# Patient Record
Sex: MALE | Race: BLACK OR AFRICAN AMERICAN | Employment: FULL TIME | URBAN - METROPOLITAN AREA
[De-identification: names, ages, dates, MRNs, and addresses within clinical notes are randomized per-mention and may not be internally consistent; named-entity substitution may affect disease eponyms.]

---

## 2017-01-21 ENCOUNTER — ED HISTORICAL/CONVERTED ENCOUNTER (OUTPATIENT)
Dept: OTHER | Age: 32
End: 2017-01-21

## 2021-01-28 ENCOUNTER — OFFICE VISIT (OUTPATIENT)
Dept: NEUROLOGY | Age: 36
End: 2021-01-28
Payer: COMMERCIAL

## 2021-01-28 VITALS
TEMPERATURE: 97.3 F | BODY MASS INDEX: 37.37 KG/M2 | HEIGHT: 73 IN | HEART RATE: 67 BPM | WEIGHT: 282 LBS | OXYGEN SATURATION: 98 % | SYSTOLIC BLOOD PRESSURE: 122 MMHG | DIASTOLIC BLOOD PRESSURE: 86 MMHG

## 2021-01-28 DIAGNOSIS — D86.9 SARCOIDOSIS: ICD-10-CM

## 2021-01-28 DIAGNOSIS — G43.011 INTRACTABLE MIGRAINE WITHOUT AURA AND WITH STATUS MIGRAINOSUS: Primary | ICD-10-CM

## 2021-01-28 PROCEDURE — 99204 OFFICE O/P NEW MOD 45 MIN: CPT | Performed by: PSYCHIATRY & NEUROLOGY

## 2021-01-28 RX ORDER — SUMATRIPTAN 100 MG/1
TABLET, FILM COATED ORAL
Qty: 9 TAB | Refills: 2 | Status: SHIPPED | OUTPATIENT
Start: 2021-01-28

## 2021-01-28 RX ORDER — BUTALBITAL, ACETAMINOPHEN AND CAFFEINE 300; 40; 50 MG/1; MG/1; MG/1
CAPSULE ORAL
COMMUNITY
Start: 2021-01-17

## 2021-01-28 RX ORDER — PREDNISONE 10 MG/1
TABLET ORAL
Qty: 21 TAB | Refills: 0 | Status: SHIPPED | OUTPATIENT
Start: 2021-01-28

## 2021-01-28 NOTE — PROGRESS NOTES
Lakeisha Merida (1985) is a 38 y.o. male, new patient, here for evaluation of:    Chief complaint(s):   Chief Complaint   Patient presents with    Migraine     Referred by: [facility]    ASSESSMENT/ PLAN:    ICD-10-CM ICD-9-CM    1. Intractable migraine without aura and with status migrainosus  G43.011 346.13 MRI BRAIN W WO CONT      predniSONE (DELTASONE) 10 mg tablet      SUMAtriptan (IMITREX) 100 mg tablet   2. Sarcoidosis  D86.9 135 MRI BRAIN W WO CONT     Intractable migraine, Hx of Sarcoidosis    For persistent migraine, Rx'd 6 day course of prednisone, Sumatriptan 100 mg tablet prn migraine (limit use to 2 days per week)    Check MRI Brain +/- contrast to evaluate for any potential CNS involvement from Sarcoidosis    Follow up in 6 weeks, sooner if needed      Follow-up and Dispositions    · Return in about 6 weeks (around 3/11/2021).         =========================================    SUBJECTIVE/ OBJECTIVE:    HPI: 38 y.o. male     Pt reports sporadically, starting sometime in his 25s, every couple years, has had severe headache front or back of head, no vomiting, no nausea, + light sensitivity, sound sensitivity. Pt reports he woke up 3 weeks ago, had a slight headache, was doing yard work, started with a typical headache, evolved to pounding, pressure, nausea, vomited (one time). Tried treating at home with OTC analgesics for a couple days, persisted, so went to [facility] (a week and a half ago), given Rx for Fioricet. Headache pain ranges from a 3/10 to 10/ 10 daily. He's taking Fioricet 3 times a day and in between that he's taking BC Powder. No precipitating head injury. Never tried sumatriptan or other -triptan. No hx of CAD, no hx of HTN,     Does have a hx of Sarcoidosis. Per wife, in remission since 2010 (was admitted at [facility] and treated there for 2-3 weeks).   Not on any meds for this since discharge and he says the symptoms he was having at the time has not recurred. Review of Systems: frequent headaches    No Known Allergies    Current Outpatient Medications   Medication Sig Dispense Refill    butalbital-acetaminophen-caff (FIORICET) -40 mg per capsule TAKE 1 CAPSULE BY MOUTH THREE TIMES A DAY AS NEEDED      predniSONE (DELTASONE) 10 mg tablet Take 6 tabs on Day 1, then reduce by 1 tablet a day over the next 5 days till done. Take in AM with food. 21 Tab 0    SUMAtriptan (IMITREX) 100 mg tablet Take HALF to one tablet to onset of migraine. May take one tablet in 2 hours if headache remains. Limit use to 2 days per week. 9 Tab 2       No past medical history on file. No past surgical history on file. family history is not on file. reports that he has been smoking. He has never used smokeless tobacco. He reports previous alcohol use. Physical Exam:    Vitals:    01/28/21 0747   BP: 122/86   BP 1 Location: Left arm   BP Patient Position: Sitting   Pulse: 67   Temp: 97.3 °F (36.3 °C)   SpO2: 98%   Weight: 127.9 kg (282 lb)   Height: 6' 1\" (1.854 m)         General:  Alert, cooperative, NAD   Head:  Normocephalic, atraumatic. No occipital or suboccipital tenderness on either side  Mild tenderness in right supraorbital notch, none in left  Mild tenderness to lateral aspect of corner of right eye/ temporal area     Eyes:  Conjunctivae clear  Left cornea, possible mild cataract  Right cornea appears clear     Lungs:  Heart:  Not examined  Not examined   Extremities: No edema.    Skin: No rashes    Neurologic Exam       Language: normal  Memory:  Alert, oriented to person, place, situation    Cranial Nerves:  I: smell Not tested   II: visual fields Full to confrontation   II: pupils Equal, round, reactive to light   II: optic disc No papilledema   III,VII: ptosis none   III,IV,VI: extraocular muscles  normal   V: facial light touch sensation  normal   VII: facial muscle function  symmetric   VIII: hearing symmetric   IX: soft palate elevation  normal   XI: sternocleidomastoid strength 5/5   XII: tongue  midline      Motor: normal bulk, tone, strength in all exts  Sensory: intact to LT, PP, temp, vibration x 4 exts   Cerebellar: no rest, postural, or intention tremor  Normal FNF and H-Shin bilaterally  Reflexes: 2+ throughout  Plantar response: neutral bilaterally    Gait: normal gait including tandem  Romberg negative       An electronic signature was used to authenticate this note.   -- Thang Ledezma MD

## 2021-01-28 NOTE — PATIENT INSTRUCTIONS
PRESCRIPTION REFILL POLICY [address] [place] [address] Statement to Patients April 1, 2014 In an effort to ensure the large volume of patient prescription refills is processed in the most efficient and expeditious manner, we are asking our patients to assist us by calling your Pharmacy for all prescription refills, this will include also your  Mail Order Pharmacy. The pharmacy will contact our office electronically to continue the refill process. Please do not wait until the last minute to call your pharmacy. We need at least 48 hours (2days) to fill prescriptions. We also encourage you to call your pharmacy before going to  your prescription to make sure it is ready. With regard to controlled substance prescription refill requests (narcotic refills) that need to be picked up at our office, we ask your cooperation by providing us with at least 72 hours (3days) notice that you will need a refill. We will not refill narcotic prescription refill requests after 4:00pm on any weekday, Monday through Thursday, or after 2:00pm on Fridays, or on the weekends. We encourage everyone to explore another way of getting your prescription refill request processed using [employer], our patient web portal through our electronic medical record system. [employer] is an efficient and effective way to communicate your medication request directly to the office and  downloadable as an lashell on your smart phone . [employer] also features a review functionality that allows you to view your medication list as well as leave messages for your physician. Are you ready to get connected? If so please review the attatched instructions or speak to any of our staff to get you set up right away! Thank you so much for your cooperation. Should you have any questions please contact our Practice Administrator. The Physicians and Staff,  [address] [place] [address]

## 2021-02-15 ENCOUNTER — HOSPITAL ENCOUNTER (OUTPATIENT)
Dept: MRI IMAGING | Age: 36
Discharge: HOME | End: 2021-02-15
Attending: PSYCHIATRY & NEUROLOGY
Payer: COMMERCIAL

## 2021-02-15 DIAGNOSIS — D86.9 SARCOIDOSIS: ICD-10-CM

## 2021-02-15 DIAGNOSIS — G43.011 INTRACTABLE MIGRAINE WITHOUT AURA AND WITH STATUS MIGRAINOSUS: ICD-10-CM

## 2021-02-15 PROCEDURE — 70553 MRI BRAIN STEM W/O & W/DYE: CPT

## 2021-02-15 PROCEDURE — A9575 INJ GADOTERATE MEGLUMI 0.1ML: HCPCS | Performed by: PSYCHIATRY & NEUROLOGY

## 2021-02-15 PROCEDURE — 74011250636 HC RX REV CODE- 250/636: Performed by: PSYCHIATRY & NEUROLOGY

## 2021-02-15 RX ORDER — GADOTERATE MEGLUMINE 376.9 MG/ML
20 INJECTION INTRAVENOUS
Status: COMPLETED | OUTPATIENT
Start: 2021-02-15 | End: 2021-02-15

## 2021-02-15 RX ADMIN — GADOTERATE MEGLUMINE 20 ML: 376.9 INJECTION INTRAVENOUS at 09:55

## 2021-02-23 ENCOUNTER — TELEPHONE (OUTPATIENT)
Dept: NEUROLOGY | Age: 36
End: 2021-02-23

## 2021-02-23 NOTE — TELEPHONE ENCOUNTER
----- Message from Allen Sitter sent at 2/23/2021  1:12 PM EST -----  Regarding: Dr. Jayda Lawler first and last name: Jamee Coburn  Reason for call: follow up MRI  Callback required yes/no and why: yes  Best contact number(s): [phone]  Details to clarify the request: PT missed appt on 3/23 (traffic) and would like to discuss via phone results from MRI.

## 2021-03-10 ENCOUNTER — VIRTUAL VISIT (OUTPATIENT)
Dept: NEUROLOGY | Age: 36
End: 2021-03-10
Payer: COMMERCIAL

## 2021-03-10 DIAGNOSIS — Z86.2 HISTORY OF SARCOIDOSIS: ICD-10-CM

## 2021-03-10 DIAGNOSIS — G43.009 MIGRAINE WITHOUT AURA AND WITHOUT STATUS MIGRAINOSUS, NOT INTRACTABLE: Primary | ICD-10-CM

## 2021-03-10 PROCEDURE — 99214 OFFICE O/P EST MOD 30 MIN: CPT | Performed by: PSYCHIATRY & NEUROLOGY

## 2021-03-10 NOTE — PROGRESS NOTES
Iliana Barone (: 1985) is a 38 y.o. male, established patient, here for evaluation of the following chief complaint(s):     Follow-up (virtual visit/ migraine follow up and to discuss brain MRI results) and Migraine    ASSESSMENT/PLAN:  1. Migraine without aura and without status migrainosus, not intractable  2. History of sarcoidosis    Brain MRI with and without contrast is normal, no evidence of neurosarcoidosis  No indication to start a daily headache preventive medication based on his current headache frequency  Discussed with patient that he can continue to use sumatriptan when he has a migraine headache  He currently has 2 refills left on his last prescription, does not need another prescription today  We discussed whether he wants to set up a follow-up visit regarding his headaches; he declined  Patient may schedule follow-up visit if needed    No follow-ups on file. SUBJECTIVE/OBJECTIVE:  HPI    Reviewed MRI Brain images (with and without contrast) and report: normal study, no evidence of enhancing lesions, no evidence of stroke or any other structural abnormality; normal for age. Has some mild headache some days when he wakes up  Goes away after being up  # of headache days a week: 2  # of severe headache days per month: 1-2    The persistent headache he was having at his initial visit resolved after taking a steroid pack and using sumatriptan. He denies any side effects from the sumatriptan. Review of Systems     No flowsheet data found. No Known Allergies  Current Outpatient Medications   Medication Sig Dispense Refill    butalbital-acetaminophen-caff (FIORICET) -40 mg per capsule TAKE 1 CAPSULE BY MOUTH THREE TIMES A DAY AS NEEDED      SUMAtriptan (IMITREX) 100 mg tablet Take HALF to one tablet to onset of migraine. May take one tablet in 2 hours if headache remains. Limit use to 2 days per week.  9 Tab 2    predniSONE (DELTASONE) 10 mg tablet Take 6 tabs on Day 1, then reduce by 1 tablet a day over the next 5 days till done. Take in AM with food. 21 Tab 0     Past Medical History:   Diagnosis Date    Migraine       has no past surgical history on file. Physical Exam awake, alert, conversant. EOMI. Face appears symmetric. Hearing and speech is normal.                Deondre Hughes, was evaluated through a synchronous (real-time) audio-video encounter. The patient (or guardian if applicable) is aware that this is a billable service. Verbal consent to proceed has been obtained within the past 12 months. The visit was conducted pursuant to the emergency declaration under the [address] [place] [address], [address] [place] [address] [facility] authority and the [employer] and [employer] General Act. Patient identification was verified, and a caregiver was present when appropriate. The patient was located in a state where the provider was credentialed to provide care. An electronic signature was used to authenticate this note.   -- Tiffanie Braun MD

## 2024-05-09 ENCOUNTER — OFFICE VISIT (OUTPATIENT)
Facility: CLINIC | Age: 39
End: 2024-05-09

## 2024-05-09 VITALS
OXYGEN SATURATION: 97 % | SYSTOLIC BLOOD PRESSURE: 130 MMHG | RESPIRATION RATE: 17 BRPM | BODY MASS INDEX: 39.82 KG/M2 | WEIGHT: 294 LBS | HEART RATE: 76 BPM | TEMPERATURE: 98.9 F | HEIGHT: 72 IN | DIASTOLIC BLOOD PRESSURE: 81 MMHG

## 2024-05-09 DIAGNOSIS — R52 PAIN OF LEFT SIDE OF BODY: Primary | ICD-10-CM

## 2024-05-09 ASSESSMENT — ENCOUNTER SYMPTOMS
ALLERGIC/IMMUNOLOGIC NEGATIVE: 1
PHOTOPHOBIA: 0
RHINORRHEA: 0
EYE PAIN: 0
CHEST TIGHTNESS: 0
ABDOMINAL PAIN: 1
WHEEZING: 0
ABDOMINAL DISTENTION: 0
DIARRHEA: 0
COUGH: 0
NAUSEA: 0
VOMITING: 0
BACK PAIN: 1
SORE THROAT: 0
SHORTNESS OF BREATH: 0

## 2024-05-09 ASSESSMENT — PATIENT HEALTH QUESTIONNAIRE - PHQ9
SUM OF ALL RESPONSES TO PHQ QUESTIONS 1-9: 0
SUM OF ALL RESPONSES TO PHQ QUESTIONS 1-9: 0
2. FEELING DOWN, DEPRESSED OR HOPELESS: NOT AT ALL
SUM OF ALL RESPONSES TO PHQ QUESTIONS 1-9: 0
1. LITTLE INTEREST OR PLEASURE IN DOING THINGS: NOT AT ALL
SUM OF ALL RESPONSES TO PHQ QUESTIONS 1-9: 0
SUM OF ALL RESPONSES TO PHQ9 QUESTIONS 1 & 2: 0

## 2024-05-09 NOTE — PROGRESS NOTES
Chief Complaint   Patient presents with    New Patient     Pt states he has been having pain(tightness) in his side/flank that radiates through abdomin and back, no trauma. He advised pain is off and on,went to [facility] sent to [facility] for CT scan        /81 (Site: Left Upper Arm, Position: Sitting, Cuff Size: Large Adult)   Pulse 76   Temp 98.9 °F (37.2 °C) (Temporal)   Resp 17   Ht 1.835 m (6' 0.24\")   Wt 133.4 kg (294 lb)   SpO2 97%   BMI 39.60 kg/m²     Pain Scale: 2/10  Pain Location: Other (flank)     No current outpatient medications on file prior to visit.     No current facility-administered medications on file prior to visit.       Health Maintenance Due   Topic    Hepatitis B vaccine (1 of 3 - 3-dose series)    COVID-19 Vaccine (1)    Varicella vaccine (1 of 2 - 2-dose childhood series)    Pneumococcal 0-64 years Vaccine (1 of 2 - PCV)    Depression Screen     HIV screen     Hepatitis C screen     DTaP/Tdap/Td vaccine (1 - Tdap)    Diabetes screen     Lipids        1. \"Have you been to the ER, urgent care clinic since your last visit?  Hospitalized since your last visit?\" [facility]-side/flank pain    2. \"Have you seen or consulted any other health care providers outside of the [facility] since your last visit?\" no

## 2024-11-08 ENCOUNTER — OFFICE VISIT (OUTPATIENT)
Dept: PRIMARY CARE CLINIC | Facility: CLINIC | Age: 39
End: 2024-11-08
Payer: COMMERCIAL

## 2024-11-08 VITALS
SYSTOLIC BLOOD PRESSURE: 131 MMHG | DIASTOLIC BLOOD PRESSURE: 78 MMHG | OXYGEN SATURATION: 98 % | WEIGHT: 285.4 LBS | HEART RATE: 67 BPM | BODY MASS INDEX: 38.66 KG/M2 | HEIGHT: 72 IN

## 2024-11-08 DIAGNOSIS — R10.9 CHRONIC LEFT FLANK PAIN: Primary | ICD-10-CM

## 2024-11-08 DIAGNOSIS — G89.29 CHRONIC LEFT FLANK PAIN: Primary | ICD-10-CM

## 2024-11-08 DIAGNOSIS — Z13.220 SCREENING FOR HYPERLIPIDEMIA: ICD-10-CM

## 2024-11-08 PROCEDURE — 99203 OFFICE O/P NEW LOW 30 MIN: CPT | Performed by: STUDENT IN AN ORGANIZED HEALTH CARE EDUCATION/TRAINING PROGRAM

## 2024-11-08 NOTE — PROGRESS NOTES
Kurt Mcneal (:  1985) is a 39 y.o. male,New patient, here for evaluation of the following chief complaint(s):  New Patient (Establish care. Have been having pain on right side of chest all back mostly on one side. Started 2 years ago nobody know what it could. )         Assessment & Plan  Chronic left flank pain    Chronic left flank pain with unknown etiology.  Labs and imaging ordered for initial evaluation.  Note that patient had a CT scan completed notes that he is unaware of the results.  Advised patient to reach out to radiology department that completed it and bring records in.  Plan for follow-up in approximately 2 weeks to review CT scan imaging if he is able to acquire it as well as the lab results from today's visit.    Orders:    XR LUMBAR SPINE (2-3 VIEWS); Future    CBC with Auto Differential    Comprehensive Metabolic Panel    Screening for hyperlipidemia       Orders:    Lipid Panel      No follow-ups on file.       Subjective   HPI  Patient is a very pleasant 39-year-old male presents with right sided abdominal wall. Has been ongoing for 2 years. Pain comes and go. Does not radiate from the back, primarily located on left flank, and band-like distribution in epigastric region. Currently has the pain, describes it as a 2/10.  Review of Systems       Objective   Physical Exam  Vitals and nursing note reviewed.   Constitutional:       Appearance: Normal appearance. He is normal weight.   HENT:      Head: Normocephalic and atraumatic.      Mouth/Throat:      Mouth: Mucous membranes are moist.      Pharynx: Oropharynx is clear.   Cardiovascular:      Rate and Rhythm: Normal rate and regular rhythm.      Heart sounds: Normal heart sounds.   Pulmonary:      Effort: Pulmonary effort is normal. No respiratory distress.      Breath sounds: Normal breath sounds.   Abdominal:      General: Bowel sounds are normal. There is no distension.      Palpations: Abdomen is soft. There is no shifting

## 2024-11-09 LAB
ALBUMIN SERPL-MCNC: 4.5 G/DL (ref 4.1–5.1)
ALP SERPL-CCNC: 107 IU/L (ref 44–121)
ALT SERPL-CCNC: 43 IU/L (ref 0–44)
AST SERPL-CCNC: 28 IU/L (ref 0–40)
BASOPHILS # BLD AUTO: 0 X10E3/UL (ref 0–0.2)
BASOPHILS NFR BLD AUTO: 1 %
BILIRUB SERPL-MCNC: 0.5 MG/DL (ref 0–1.2)
BUN SERPL-MCNC: 11 MG/DL (ref 6–24)
BUN/CREAT SERPL: 10 (ref 9–20)
CALCIUM SERPL-MCNC: 9.6 MG/DL (ref 8.7–10.2)
CHLORIDE SERPL-SCNC: 104 MMOL/L (ref 96–106)
CHOLEST SERPL-MCNC: 244 MG/DL (ref 100–199)
CO2 SERPL-SCNC: 25 MMOL/L (ref 20–29)
CREAT SERPL-MCNC: 1.05 MG/DL (ref 0.76–1.27)
EGFRCR SERPLBLD CKD-EPI 2021: 91 ML/MIN/1.73
EOSINOPHIL # BLD AUTO: 0.2 X10E3/UL (ref 0–0.4)
EOSINOPHIL NFR BLD AUTO: 3 %
ERYTHROCYTE [DISTWIDTH] IN BLOOD BY AUTOMATED COUNT: 13.3 % (ref 11.6–15.4)
GLOBULIN SER CALC-MCNC: 2.6 G/DL (ref 1.5–4.5)
GLUCOSE SERPL-MCNC: 94 MG/DL (ref 70–99)
HCT VFR BLD AUTO: 44.6 % (ref 37.5–51)
HDLC SERPL-MCNC: 40 MG/DL
HGB BLD-MCNC: 14 G/DL (ref 13–17.7)
IMM GRANULOCYTES # BLD AUTO: 0 X10E3/UL (ref 0–0.1)
IMM GRANULOCYTES NFR BLD AUTO: 0 %
LDLC SERPL CALC-MCNC: 186 MG/DL (ref 0–99)
LYMPHOCYTES # BLD AUTO: 2 X10E3/UL (ref 0.7–3.1)
LYMPHOCYTES NFR BLD AUTO: 37 %
MCH RBC QN AUTO: 27.1 PG (ref 26.6–33)
MCHC RBC AUTO-ENTMCNC: 31.4 G/DL (ref 31.5–35.7)
MCV RBC AUTO: 86 FL (ref 79–97)
MONOCYTES # BLD AUTO: 0.4 X10E3/UL (ref 0.1–0.9)
MONOCYTES NFR BLD AUTO: 7 %
NEUTROPHILS # BLD AUTO: 2.9 X10E3/UL (ref 1.4–7)
NEUTROPHILS NFR BLD AUTO: 52 %
PLATELET # BLD AUTO: 352 X10E3/UL (ref 150–450)
POTASSIUM SERPL-SCNC: 4.9 MMOL/L (ref 3.5–5.2)
PROT SERPL-MCNC: 7.1 G/DL (ref 6–8.5)
RBC # BLD AUTO: 5.17 X10E6/UL (ref 4.14–5.8)
SODIUM SERPL-SCNC: 143 MMOL/L (ref 134–144)
TRIGL SERPL-MCNC: 100 MG/DL (ref 0–149)
VLDLC SERPL CALC-MCNC: 18 MG/DL (ref 5–40)
WBC # BLD AUTO: 5.6 X10E3/UL (ref 3.4–10.8)

## 2024-11-22 ENCOUNTER — OFFICE VISIT (OUTPATIENT)
Dept: PRIMARY CARE CLINIC | Facility: CLINIC | Age: 39
End: 2024-11-22
Payer: COMMERCIAL

## 2024-11-22 VITALS
OXYGEN SATURATION: 98 % | BODY MASS INDEX: 37.88 KG/M2 | DIASTOLIC BLOOD PRESSURE: 84 MMHG | WEIGHT: 285.8 LBS | HEIGHT: 73 IN | TEMPERATURE: 97.9 F | SYSTOLIC BLOOD PRESSURE: 147 MMHG | HEART RATE: 70 BPM

## 2024-11-22 DIAGNOSIS — N30.90 CYSTITIS: ICD-10-CM

## 2024-11-22 DIAGNOSIS — R10.30 LOWER ABDOMINAL PAIN: ICD-10-CM

## 2024-11-22 DIAGNOSIS — N40.0 ENLARGED PROSTATE: ICD-10-CM

## 2024-11-22 DIAGNOSIS — E78.5 HYPERLIPIDEMIA, UNSPECIFIED HYPERLIPIDEMIA TYPE: Primary | ICD-10-CM

## 2024-11-22 PROCEDURE — 99214 OFFICE O/P EST MOD 30 MIN: CPT | Performed by: STUDENT IN AN ORGANIZED HEALTH CARE EDUCATION/TRAINING PROGRAM

## 2024-11-22 RX ORDER — ATORVASTATIN CALCIUM 10 MG/1
10 TABLET, FILM COATED ORAL
Qty: 90 TABLET | Refills: 0 | Status: SHIPPED | OUTPATIENT
Start: 2024-11-22

## 2024-11-22 RX ORDER — MELOXICAM 15 MG/1
15 TABLET ORAL DAILY PRN
Qty: 15 TABLET | Refills: 0 | Status: SHIPPED | OUTPATIENT
Start: 2024-11-22 | End: 2024-12-07

## 2024-11-22 RX ORDER — NITROFURANTOIN 25; 75 MG/1; MG/1
100 CAPSULE ORAL 2 TIMES DAILY
Qty: 14 CAPSULE | Refills: 0 | Status: SHIPPED | OUTPATIENT
Start: 2024-11-22 | End: 2024-11-29

## 2024-11-22 SDOH — ECONOMIC STABILITY: FOOD INSECURITY: WITHIN THE PAST 12 MONTHS, THE FOOD YOU BOUGHT JUST DIDN'T LAST AND YOU DIDN'T HAVE MONEY TO GET MORE.: NEVER TRUE

## 2024-11-22 SDOH — ECONOMIC STABILITY: FOOD INSECURITY: WITHIN THE PAST 12 MONTHS, YOU WORRIED THAT YOUR FOOD WOULD RUN OUT BEFORE YOU GOT MONEY TO BUY MORE.: NEVER TRUE

## 2024-11-22 SDOH — ECONOMIC STABILITY: INCOME INSECURITY: HOW HARD IS IT FOR YOU TO PAY FOR THE VERY BASICS LIKE FOOD, HOUSING, MEDICAL CARE, AND HEATING?: NOT HARD AT ALL

## 2024-11-22 ASSESSMENT — PATIENT HEALTH QUESTIONNAIRE - PHQ9
SUM OF ALL RESPONSES TO PHQ QUESTIONS 1-9: 0
SUM OF ALL RESPONSES TO PHQ9 QUESTIONS 1 & 2: 0
2. FEELING DOWN, DEPRESSED OR HOPELESS: NOT AT ALL
SUM OF ALL RESPONSES TO PHQ QUESTIONS 1-9: 0
SUM OF ALL RESPONSES TO PHQ QUESTIONS 1-9: 0
1. LITTLE INTEREST OR PLEASURE IN DOING THINGS: NOT AT ALL
SUM OF ALL RESPONSES TO PHQ QUESTIONS 1-9: 0

## 2024-11-22 NOTE — PROGRESS NOTES
Kurt Mcneal (:  1985) is a 39 y.o. male,Established patient, here for evaluation of the following chief complaint(s):  Follow-up (Patient is here to follow-up from his last visit in office. )         Assessment & Plan  Hyperlipidemia, unspecified hyperlipidemia type    ASCVD and results reviewed with patient. Statin ordered.    Orders:    atorvastatin (LIPITOR) 10 MG tablet; Take 1 tablet by mouth nightly    Lower abdominal pain    Multiple causes noted including cystitis, colitis, as well as prostatomegaly, as well as degenerative changes of lumbar spine.  NSAIDs sent to provide relief.  Decision is made to put on a short course of antibiotics to see for improvement as well.  Plan for follow-up earlier if needed, otherwise follow-up as discussed.    Orders:    UA W/Microscopic, Rfx to Culture ([facility] Default); Future    meloxicam (MOBIC) 15 MG tablet; Take 1 tablet by mouth daily as needed for Pain    nitrofurantoin, macrocrystal-monohydrate, (MACROBID) 100 MG capsule; Take 1 capsule by mouth 2 times daily for 7 days    Enlarged prostate    Noted on CT scan results that patient brought.  Urology referral will be placed for further evaluation of prostatic megaly contributing to his left flank/lower abdomen symptoms    Orders:    [facility], [place]    Cystitis    After discussion, patient remembers that he was given antibiotics after the CT scan, however he did not complete his antibiotic course as he felt that the pills were too big.  Discussed importance of completing antibiotic course, given high susceptibility to possible drug resistance, nitrofurantoin was chosen.  Advised patient and hide recommended to complete full antibiotic course.  Follow-up if no substantial improvement.    Orders:    nitrofurantoin, macrocrystal-monohydrate, (MACROBID) 100 MG capsule; Take 1 capsule by mouth 2 times daily for 7 days      No follow-ups on file.       Subjective   HPI  Patient is a very

## 2024-11-23 LAB
APPEARANCE UR: CLEAR
BACTERIA #/AREA URNS HPF: NORMAL /[HPF]
BILIRUB UR QL STRIP: NEGATIVE
CASTS URNS QL MICRO: NORMAL /LPF
COLOR UR: YELLOW
EPI CELLS #/AREA URNS HPF: NORMAL /HPF (ref 0–10)
GLUCOSE UR QL STRIP: NEGATIVE
HGB UR QL STRIP: NEGATIVE
KETONES UR QL STRIP: NEGATIVE
LEUKOCYTE ESTERASE UR QL STRIP: NEGATIVE
MICRO URNS: NORMAL
MICRO URNS: NORMAL
NITRITE UR QL STRIP: NEGATIVE
PH UR STRIP: 6 [PH] (ref 5–7.5)
PROT UR QL STRIP: NEGATIVE
RBC #/AREA URNS HPF: NORMAL /HPF (ref 0–2)
SP GR UR STRIP: 1.02 (ref 1–1.03)
URINALYSIS REFLEX: NORMAL
UROBILINOGEN UR STRIP-MCNC: 0.2 MG/DL (ref 0.2–1)
WBC #/AREA URNS HPF: NORMAL /HPF (ref 0–5)

## 2025-02-20 DIAGNOSIS — E78.5 HYPERLIPIDEMIA, UNSPECIFIED HYPERLIPIDEMIA TYPE: ICD-10-CM

## 2025-02-20 RX ORDER — ATORVASTATIN CALCIUM 10 MG/1
10 TABLET, FILM COATED ORAL NIGHTLY
Qty: 90 TABLET | Refills: 0 | Status: SHIPPED | OUTPATIENT
Start: 2025-02-20